# Patient Record
Sex: MALE | Race: WHITE
[De-identification: names, ages, dates, MRNs, and addresses within clinical notes are randomized per-mention and may not be internally consistent; named-entity substitution may affect disease eponyms.]

---

## 2021-06-27 ENCOUNTER — HOSPITAL ENCOUNTER (EMERGENCY)
Dept: HOSPITAL 94 - ER | Age: 81
Discharge: HOME | End: 2021-06-27
Payer: MEDICARE

## 2021-06-27 VITALS — BODY MASS INDEX: 25.95 KG/M2 | WEIGHT: 185.39 LBS | HEIGHT: 71 IN

## 2021-06-27 VITALS — SYSTOLIC BLOOD PRESSURE: 118 MMHG | DIASTOLIC BLOOD PRESSURE: 76 MMHG

## 2021-06-27 DIAGNOSIS — I10: ICD-10-CM

## 2021-06-27 DIAGNOSIS — E78.00: ICD-10-CM

## 2021-06-27 DIAGNOSIS — E07.9: ICD-10-CM

## 2021-06-27 DIAGNOSIS — Z95.1: ICD-10-CM

## 2021-06-27 DIAGNOSIS — I25.10: ICD-10-CM

## 2021-06-27 DIAGNOSIS — E11.9: ICD-10-CM

## 2021-06-27 DIAGNOSIS — R53.1: Primary | ICD-10-CM

## 2021-06-27 PROCEDURE — 99284 EMERGENCY DEPT VISIT MOD MDM: CPT
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
